# Patient Record
Sex: FEMALE | Race: WHITE | Employment: FULL TIME | URBAN - METROPOLITAN AREA
[De-identification: names, ages, dates, MRNs, and addresses within clinical notes are randomized per-mention and may not be internally consistent; named-entity substitution may affect disease eponyms.]

---

## 2022-06-01 ENCOUNTER — HOSPITAL ENCOUNTER (EMERGENCY)
Age: 42
Discharge: HOME | End: 2022-06-01
Attending: EMERGENCY MEDICINE
Payer: COMMERCIAL

## 2022-06-01 VITALS
RESPIRATION RATE: 14 BRPM | BODY MASS INDEX: 28.12 KG/M2 | WEIGHT: 175 LBS | HEART RATE: 48 BPM | DIASTOLIC BLOOD PRESSURE: 69 MMHG | SYSTOLIC BLOOD PRESSURE: 104 MMHG | OXYGEN SATURATION: 97 % | HEIGHT: 66 IN | TEMPERATURE: 97.7 F

## 2022-06-01 DIAGNOSIS — F10.920 ACUTE ALCOHOLIC INTOXICATION WITHOUT COMPLICATION (HCC): ICD-10-CM

## 2022-06-01 DIAGNOSIS — T50.901A ACCIDENTAL DRUG OVERDOSE, INITIAL ENCOUNTER: Primary | ICD-10-CM

## 2022-06-01 LAB
ABSOLUTE EOS #: 0.17 K/UL (ref 0–0.44)
ABSOLUTE IMMATURE GRANULOCYTE: 0.15 K/UL (ref 0–0.3)
ABSOLUTE LYMPH #: 3.85 K/UL (ref 1.1–3.7)
ABSOLUTE MONO #: 0.58 K/UL (ref 0.1–1.2)
ACETAMINOPHEN LEVEL: <10 UG/ML (ref 10–30)
ANION GAP SERPL CALCULATED.3IONS-SCNC: 14 MMOL/L (ref 9–17)
BASOPHILS # BLD: 0 % (ref 0–2)
BASOPHILS ABSOLUTE: 0.03 K/UL (ref 0–0.2)
BUN BLDV-MCNC: 22 MG/DL (ref 6–20)
BUN/CREAT BLD: 23 (ref 9–20)
CALCIUM SERPL-MCNC: 8 MG/DL (ref 8.6–10.4)
CHLORIDE BLD-SCNC: 105 MMOL/L (ref 98–107)
CO2: 17 MMOL/L (ref 20–31)
CREAT SERPL-MCNC: 0.97 MG/DL (ref 0.5–0.9)
EOSINOPHILS RELATIVE PERCENT: 1 % (ref 1–4)
ETHANOL PERCENT: 0.15 %
ETHANOL: 153 MG/DL
GFR AFRICAN AMERICAN: >60 ML/MIN
GFR NON-AFRICAN AMERICAN: >60 ML/MIN
GFR SERPL CREATININE-BSD FRML MDRD: ABNORMAL ML/MIN/{1.73_M2}
GLUCOSE BLD-MCNC: 106 MG/DL (ref 70–99)
HCT VFR BLD CALC: 38.4 % (ref 36.3–47.1)
HEMOGLOBIN: 12.4 G/DL (ref 11.9–15.1)
IMMATURE GRANULOCYTES: 1 %
LACTIC ACID: 2.2 MMOL/L (ref 0.5–2.2)
LYMPHOCYTES # BLD: 28 % (ref 24–43)
MCH RBC QN AUTO: 30.9 PG (ref 25.2–33.5)
MCHC RBC AUTO-ENTMCNC: 32.3 G/DL (ref 28.4–34.8)
MCV RBC AUTO: 95.8 FL (ref 82.6–102.9)
MONOCYTES # BLD: 4 % (ref 3–12)
NRBC AUTOMATED: 0 PER 100 WBC
PDW BLD-RTO: 13.1 % (ref 11.8–14.4)
PLATELET # BLD: 280 K/UL (ref 138–453)
PMV BLD AUTO: 9.6 FL (ref 8.1–13.5)
POTASSIUM SERPL-SCNC: 4.2 MMOL/L (ref 3.7–5.3)
RBC # BLD: 4.01 M/UL (ref 3.95–5.11)
SALICYLATE LEVEL: <1 MG/DL (ref 3–10)
SEG NEUTROPHILS: 66 % (ref 36–65)
SEGMENTED NEUTROPHILS ABSOLUTE COUNT: 8.83 K/UL (ref 1.5–8.1)
SODIUM BLD-SCNC: 136 MMOL/L (ref 135–144)
WBC # BLD: 13.6 K/UL (ref 3.5–11.3)

## 2022-06-01 PROCEDURE — 93005 ELECTROCARDIOGRAM TRACING: CPT | Performed by: EMERGENCY MEDICINE

## 2022-06-01 PROCEDURE — 85025 COMPLETE CBC W/AUTO DIFF WBC: CPT

## 2022-06-01 PROCEDURE — 87040 BLOOD CULTURE FOR BACTERIA: CPT

## 2022-06-01 PROCEDURE — 80143 DRUG ASSAY ACETAMINOPHEN: CPT

## 2022-06-01 PROCEDURE — 83605 ASSAY OF LACTIC ACID: CPT

## 2022-06-01 PROCEDURE — 2580000003 HC RX 258: Performed by: EMERGENCY MEDICINE

## 2022-06-01 PROCEDURE — G0480 DRUG TEST DEF 1-7 CLASSES: HCPCS

## 2022-06-01 PROCEDURE — 80179 DRUG ASSAY SALICYLATE: CPT

## 2022-06-01 PROCEDURE — 80048 BASIC METABOLIC PNL TOTAL CA: CPT

## 2022-06-01 PROCEDURE — 6370000000 HC RX 637 (ALT 250 FOR IP): Performed by: EMERGENCY MEDICINE

## 2022-06-01 PROCEDURE — 99284 EMERGENCY DEPT VISIT MOD MDM: CPT

## 2022-06-01 PROCEDURE — 36415 COLL VENOUS BLD VENIPUNCTURE: CPT

## 2022-06-01 RX ORDER — METHOCARBAMOL 500 MG/1
TABLET, FILM COATED ORAL
COMMUNITY
Start: 2022-05-16

## 2022-06-01 RX ORDER — TIZANIDINE 4 MG/1
4 TABLET ORAL EVERY 8 HOURS PRN
COMMUNITY
Start: 2022-05-19

## 2022-06-01 RX ORDER — ACETAMINOPHEN 500 MG
1000 TABLET ORAL EVERY 6 HOURS PRN
COMMUNITY
Start: 2021-06-24

## 2022-06-01 RX ORDER — GABAPENTIN 300 MG/1
300 CAPSULE ORAL
COMMUNITY
Start: 2022-05-23 | End: 2022-06-22

## 2022-06-01 RX ORDER — ETONOGESTREL AND ETHINYL ESTRADIOL 11.7; 2.7 MG/1; MG/1
1 INSERT, EXTENDED RELEASE VAGINAL
COMMUNITY
Start: 2022-05-02

## 2022-06-01 RX ORDER — 0.9 % SODIUM CHLORIDE 0.9 %
1000 INTRAVENOUS SOLUTION INTRAVENOUS ONCE
Status: COMPLETED | OUTPATIENT
Start: 2022-06-01 | End: 2022-06-01

## 2022-06-01 RX ORDER — ACETAMINOPHEN 500 MG
1000 TABLET ORAL ONCE
Status: COMPLETED | OUTPATIENT
Start: 2022-06-01 | End: 2022-06-01

## 2022-06-01 RX ORDER — IBUPROFEN 800 MG/1
800 TABLET ORAL EVERY 8 HOURS PRN
COMMUNITY
Start: 2022-05-19

## 2022-06-01 RX ORDER — AMITRIPTYLINE HYDROCHLORIDE 10 MG/1
10 TABLET, FILM COATED ORAL NIGHTLY
COMMUNITY
Start: 2022-05-19

## 2022-06-01 RX ORDER — MIDODRINE HYDROCHLORIDE 10 MG/1
10 TABLET ORAL ONCE
Status: COMPLETED | OUTPATIENT
Start: 2022-06-01 | End: 2022-06-01

## 2022-06-01 RX ADMIN — MIDODRINE HYDROCHLORIDE 10 MG: 10 TABLET ORAL at 04:42

## 2022-06-01 RX ADMIN — ACETAMINOPHEN 1000 MG: 500 TABLET ORAL at 04:42

## 2022-06-01 RX ADMIN — SODIUM CHLORIDE 1000 ML: 9 INJECTION, SOLUTION INTRAVENOUS at 04:03

## 2022-06-01 ASSESSMENT — ENCOUNTER SYMPTOMS
EYE DISCHARGE: 0
COUGH: 0
VOMITING: 0
EYE REDNESS: 0
SORE THROAT: 0
RHINORRHEA: 0
SHORTNESS OF BREATH: 0
NAUSEA: 0
COLOR CHANGE: 0
DIARRHEA: 0

## 2022-06-01 ASSESSMENT — PAIN SCALES - GENERAL: PAINLEVEL_OUTOF10: 6
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
## 2022-06-01 NOTE — ED PROVIDER NOTES
EMERGENCY DEPARTMENT ENCOUNTER    Pt Name: Douglas Durbin  MRN: [id number]  [place] 1980  Date of evaluation: 6/1/22  CHIEF COMPLAINT       Chief Complaint   Patient presents with    Hypotension     HISTORY OF PRESENT ILLNESS   This is a 82-year-old female that presents with complaints of syncopal episode, decreased blood pressure. Patient states that she had some alcohol this evening, she was trying to go to sleep and she took her nighttime sleeping medications, some muscle relaxers and subsequently had some issues with dizziness and not feeling well. Patient denies any suicidal or homicidal ideation. REVIEW OF SYSTEMS     Review of Systems   Constitutional: Negative for chills and fever. HENT: Negative for rhinorrhea and sore throat. Eyes: Negative for discharge, redness and visual disturbance. Respiratory: Negative for cough and shortness of breath. Cardiovascular: Negative for chest pain, palpitations and leg swelling. Gastrointestinal: Negative for diarrhea, nausea and vomiting. Musculoskeletal: Negative for arthralgias, myalgias and neck pain. Skin: Negative for color change and rash. Neurological: Positive for dizziness and syncope. Negative for seizures, weakness and headaches. Psychiatric/Behavioral: Negative for hallucinations, self-injury and suicidal ideas. PASTMEDICAL HISTORY   No past medical history on file. Past Problem List  Patient Active Problem List   Diagnosis Code    Elective procedure for unacceptable cosmetic appearance Z41.1     SURGICAL HISTORY     No past surgical history on file.   CURRENT MEDICATIONS       Current Discharge Medication List      CONTINUE these medications which have NOT CHANGED    Details   tiZANidine (ZANAFLEX) 4 MG tablet Take 4 mg by mouth every 8 hours as needed      acetaminophen (TYLENOL) 500 MG tablet Take 1,000 mg by mouth every 6 hours as needed      amitriptyline (ELAVIL) 10 mg tablet Take 10 mg by mouth nightly gabapentin (NEURONTIN) 300 MG capsule Take 300 mg by mouth. ibuprofen (ADVIL;MOTRIN) 800 MG tablet Take 800 mg by mouth every 8 hours as needed      methocarbamol (ROBAXIN) 500 MG tablet Take 3 pills (1500 mg) 4 times daily as needed for muscle spasm for 3 days, then down to 2 pills (1000 mg) 4 times daily as needed for muscle spasm      etonogestrel-ethinyl estradiol (NUVARING) 0.12-0.015 MG/24HR vaginal ring Place 1 each vaginally      amphetamine-dextroamphetamine (ADDERALL) 30 MG tablet Take 30 mg by mouth daily           ALLERGIES     has No Known Allergies. FAMILY HISTORY     has no family status information on file. SOCIAL HISTORY       Social History     Tobacco Use    Smoking status: Current Every Day Smoker    Smokeless tobacco: Never Used   Substance Use Topics    Alcohol use: Yes     Comment: 3x week    Drug use: No     PHYSICAL EXAM     INITIAL VITALS: BP 84/63   Pulse 55   Temp 97.7 °F (36.5 °C) (Oral)   Resp 18   Ht 5' 6\" (1.676 m)   Wt 175 lb (79.4 kg)   LMP 05/23/2022   SpO2 97%   BMI 28.25 kg/m²    Physical Exam  Constitutional:       Appearance: Normal appearance. She is well-developed. She is not ill-appearing or toxic-appearing. HENT:      Head: Normocephalic and atraumatic. Eyes:      Conjunctiva/sclera: Conjunctivae normal.      Pupils: Pupils are equal, round, and reactive to light. Neck:      Trachea: Trachea normal.   Cardiovascular:      Rate and Rhythm: Normal rate and regular rhythm. Heart sounds: S1 normal and S2 normal. No murmur heard. Pulmonary:      Effort: Pulmonary effort is normal. No accessory muscle usage or respiratory distress. Breath sounds: Normal breath sounds. Chest:      Chest wall: No deformity or tenderness. Abdominal:      General: Bowel sounds are normal. There is no distension or abdominal bruit. Palpations: Abdomen is not rigid. Tenderness: There is no abdominal tenderness.  There is no guarding or rebound. Musculoskeletal:      Cervical back: Normal range of motion and neck supple. Skin:     General: Skin is warm. Findings: No rash. Neurological:      Mental Status: She is oriented to person, place, and time. She is lethargic. GCS: GCS eye subscore is 4. GCS verbal subscore is 5. GCS motor subscore is 6. Psychiatric:         Speech: Speech normal.         MEDICAL DECISION MAKIN-year-old female presents with complaints of medication overdose with hypotension. Plan is basic labs IV fluids and reevaluation. 6:14 AM EDT  Patient is feeling much better, she is awake and alert, patient will be picked up from the emergency department by her mother. CRITICAL CARE:       PROCEDURES:    Procedures    DIAGNOSTIC RESULTS   EKG:All EKG's are interpreted by the Emergency Department Physician who either signs or Co-signs this chart in the absence of a cardiologist.    Patient's EKG shows sinus rhythm with a rate of 76, MA QRS QTC intervals unremarkable the patient has normal axis, no ST elevations or depressions, no significant T wave changes. Nonspecific EKG. RADIOLOGY:All plain film, CT, MRI, and formal ultrasound images (except ED bedside ultrasound) are read by the radiologist, see reports below, unless otherwisenoted in MDM or here. No orders to display     LABS: All lab results were reviewed by myself, and all abnormals are listed below.   Labs Reviewed   BASIC METABOLIC PANEL - Abnormal; Notable for the following components:       Result Value    Glucose 106 (*)     BUN 22 (*)     CREATININE 0.97 (*)     Bun/Cre Ratio 23 (*)     Calcium 8.0 (*)     CO2 17 (*)     All other components within normal limits   ACETAMINOPHEN LEVEL - Abnormal; Notable for the following components:    Acetaminophen Level <10 (*)     All other components within normal limits   SALICYLATE LEVEL - Abnormal; Notable for the following components:    Salicylate Lvl <1 (*)     All other components within normal limits   ETHANOL - Abnormal; Notable for the following components:    Ethanol 153 (*)     Ethanol percent 0.153 (*)     All other components within normal limits   CBC WITH AUTO DIFFERENTIAL - Abnormal; Notable for the following components:    WBC 13.6 (*)     Seg Neutrophils 66 (*)     Immature Granulocytes 1 (*)     Segs Absolute 8.83 (*)     Absolute Lymph # 3.85 (*)     All other components within normal limits   CULTURE, BLOOD 1   CULTURE, BLOOD 1   LACTIC ACID   URINE DRUG SCREEN   URINALYSIS       EMERGENCY DEPARTMENTCOURSE:         Vitals:    Vitals:    06/01/22 0424 06/01/22 0439 06/01/22 0459 06/01/22 0537   BP: 91/72 83/64 85/63 84/63   Pulse: 67 60 60 55   Resp: 27 12 16 18   Temp:       TempSrc:       SpO2:       Weight:       Height:           The patient was given the following medications while in the emergency department:  Orders Placed This Encounter   Medications    0.9 % sodium chloride bolus    DISCONTD: norepinephrine (LEVOPHED) 16 mg in dextrose 5% 250 mL infusion     Order Specific Question:   Titrate Infusion? Answer:   Yes     Order Specific Question:   Initial Infusion Dose: Answer:   5 mcg/min     Order Specific Question:   Goal of Therapy is: Answer:   MAP greater than 65 mmHg     Order Specific Question:   Contact Provider if:     Answer:   Patient is receiving the maximum dose and is not achieving the goal of therapy    midodrine (PROAMATINE) tablet 10 mg    acetaminophen (TYLENOL) tablet 1,000 mg     CONSULTS:  None    FINAL IMPRESSION      1. Accidental drug overdose, initial encounter    2.  Acute alcoholic intoxication without complication [facility])          DISPOSITION/PLAN   DISPOSITION Decision To Discharge 06/01/2022 06:11:44 AM      PATIENT REFERRED TO:  Markie Amaya  [id number] [facility], [facility] [address]  [phone]    Schedule an appointment as soon as possible for a visit in 2 days      DISCHARGE MEDICATIONS:  Current Discharge Medication List

## 2022-06-02 LAB
EKG ATRIAL RATE: 76 BPM
EKG P AXIS: 51 DEGREES
EKG P-R INTERVAL: 144 MS
EKG Q-T INTERVAL: 380 MS
EKG QRS DURATION: 86 MS
EKG QTC CALCULATION (BAZETT): 427 MS
EKG R AXIS: 76 DEGREES
EKG T AXIS: 64 DEGREES
EKG VENTRICULAR RATE: 76 BPM

## 2022-06-06 LAB
CULTURE: NORMAL
CULTURE: NORMAL
Lab: NORMAL
Lab: NORMAL
SPECIMEN DESCRIPTION: NORMAL
SPECIMEN DESCRIPTION: NORMAL